# Patient Record
Sex: FEMALE | URBAN - METROPOLITAN AREA
[De-identification: names, ages, dates, MRNs, and addresses within clinical notes are randomized per-mention and may not be internally consistent; named-entity substitution may affect disease eponyms.]

---

## 2024-07-10 ENCOUNTER — TELEPHONE (OUTPATIENT)
Dept: SURGERY | Facility: CLINIC | Age: 44
End: 2024-07-10

## 2024-07-10 NOTE — TELEPHONE ENCOUNTER
Pt called asking if we have a surgeon who does bilateral nipple sparing mastectomy she did confirm she does have cancer on the left side     I have made her a chart to give to bernard   I have got all images and reports sent from [facility] [place]     Pt to clinic for herceptin, offers no complaints today, tolerated infusion without complications, aware of next appointment, PIV removed, avs declined

## 2024-07-12 ENCOUNTER — TELEPHONE (OUTPATIENT)
Dept: SURGERY | Facility: CLINIC | Age: 44
End: 2024-07-12

## 2024-07-12 DIAGNOSIS — D05.12 BREAST NEOPLASM, TIS (DCIS), LEFT: Primary | ICD-10-CM

## 2024-07-12 NOTE — TELEPHONE ENCOUNTER
Left patient message to call me back.   Mirela took referral I believe by phone-  Need to know if she is a self referral  Has she seen a surgery for her 6/2024 breast cancer and who   Any breast surgeries yet and where  And genetic testing or mri ordered or done for her and where    We need her insurance name and number for her chart      Spoke with patient, she saw Dr Ted Haase @ [facility], genetic testing was preformed. I will get this.   Pt has had a breast reduction, but no other breast surgeries  No mri ordered for her yet